# Patient Record
Sex: MALE | URBAN - METROPOLITAN AREA
[De-identification: names, ages, dates, MRNs, and addresses within clinical notes are randomized per-mention and may not be internally consistent; named-entity substitution may affect disease eponyms.]

---

## 2020-04-20 ENCOUNTER — COMMUNICATION - HEALTHEAST (OUTPATIENT)
Dept: HEALTH INFORMATION MANAGEMENT | Facility: CLINIC | Age: 55
End: 2020-04-20

## 2021-06-20 NOTE — LETTER
Letter by Erin Guillermo at      Author: Erin Guillermo Service: -- Author Type: --    Filed:  Encounter Date: 2020 Status: (Other)         2020       Troy Marin  [address]  [facility] [address]    Dear Troy Funes:    We are pleased to provide you with secure, online access to medical information for you and your family. Per your request, we have expanded your account to allow access to the records of the following family members:              Erica Phelpsphilip (privilege ends on 2025.)     How Do I Login?  1. In your Internet browser, go to [email or web address].  2. Click on Sign Up Now   3. Enter your [employer] Activation Code exactly as it appears below. This code will  60 days after it is generated. You will not need to use this code after you have completed the sign-up process. If you do not sign up before the expiration date, you must request a new code.     [employer] Activation Code: [email or web address]  Expires: 2020 10:47 AM    4. Enter in your date of birth and zip code.  5. Create a [employer] username. Think of one that is secure and easy to remember.  Your username must be between 6 and 20 characters.  6. Create a [employer] password. You can change your password at any time. Your password must be between 8 and 45 characters, contain at least two letters and one number, and contain both upper and lower case letters.  7. Choose a security question, enter your answer, and click Next. This can be used to access [employer] if you forget your password.   8. Enter a valid e-mail address to receive e-mail notifications when new information is available in [employer].  9. Click Sign In.        How Do I Access a Family Member's Account?  10. Select the account you want to access by clicking the [place] with the appropriate patient's name at the top of your screen.   11. You will see a disclaimer page letting you know that you will be viewing a family  member's record. Review the disclaimer and then click Accept Proxy Access Disclaimer to proceed.  12. Once you switch to viewing a family member's record, you can navigate to [email or web address] pages the same way you would for yourself. You can return to your own account by clicking the [place] at the top of the screen with your name on it.    13. To customize colors and names of the linked accounts, you can select Personalize from the Profile dropdown menu at the top of the screen, then click the Edit button to make changes.      Additional Information  If you have questions, you can e-mail [email or web address] or call [phone] to talk to our [facility] [email or web address] staff. Remember, [email or web address] is NOT to be used for urgent needs. For medical emergencies, dial 911.

## 2021-06-20 NOTE — LETTER
Letter by Erin Guillermo at      Author: Erin Guillermo Service: -- Author Type: --    Filed:  Encounter Date: 2020 Status: (Other)       N  2020       Troy Funes  [address]  [facility] [address]    Dear Troy Funes:    We are pleased to provide you with secure, online access to medical information for you and your family. Per your request, we have expanded your account to allow access to the records of the following family members:              Erica Funes (privilege ends on 2025.)     How Do I Login?  1. In your Internet browser, go to [email or web address].  2. Click on Sign Up Now   3. Enter your [employer] Activation Code exactly as it appears below. This code will  60 days after it is generated. You will not need to use this code after you have completed the sign-up process. If you do not sign up before the expiration date, you must request a new code.     [employer] Activation Code: [email or web address]  Expires: 2020 10:47 AM    4. Enter in your date of birth and zip code.  5. Create a [place] username. Think of one that is secure and easy to remember.  Your username must be between 6 and 20 characters.  6. Create a [place] password. You can change your password at any time. Your password must be between 8 and 45 characters, contain at least two letters and one number, and contain both upper and lower case letters.  7. Choose a security question, enter your answer, and click Next. This can be used to access [employer] if you forget your password.   8. Enter a valid e-mail address to receive e-mail notifications when new information is available in [employer].  9. Click Sign In.        How Do I Access a Family Member's Account?  10. Select the account you want to access by clicking the [place] with the appropriate patient's name at the top of your screen.   11. You will see a disclaimer page letting you know that you will be viewing a family  member's record. Review the disclaimer and then click Accept Proxy Access Disclaimer to proceed.  12. Once you switch to viewing a family member's record, you can navigate to [email or web address] pages the same way you would for yourself. You can return to your own account by clicking the [place] at the top of the screen with your name on it.    13. To customize colors and names of the linked accounts, you can select Personalize from the Profile dropdown menu at the top of the screen, then click the Edit button to make changes.      Additional Information  If you have questions, you can e-mail [email or web address] or call [phone] to talk to our [facility] [email or web address] staff. Remember, [email or web address] is NOT to be used for urgent needs. For medical emergencies, dial 911.

## 2021-08-22 ENCOUNTER — HEALTH MAINTENANCE LETTER (OUTPATIENT)
Age: 56
End: 2021-08-22

## 2021-10-17 ENCOUNTER — HEALTH MAINTENANCE LETTER (OUTPATIENT)
Age: 56
End: 2021-10-17

## 2022-10-01 ENCOUNTER — HEALTH MAINTENANCE LETTER (OUTPATIENT)
Age: 57
End: 2022-10-01

## 2022-12-26 ENCOUNTER — TRANSCRIBE ORDERS (OUTPATIENT)
Dept: OTHER | Age: 57
End: 2022-12-26

## 2022-12-26 DIAGNOSIS — K20.0 EOSINOPHILIC ESOPHAGITIS: Primary | ICD-10-CM

## 2023-02-28 ENCOUNTER — TELEPHONE (OUTPATIENT)
Dept: GASTROENTEROLOGY | Facility: CLINIC | Age: 58
End: 2023-02-28
Payer: COMMERCIAL

## 2023-02-28 NOTE — TELEPHONE ENCOUNTER
NKECHI, sent mychart      Appt on 4/13/23 with Dr. Hernandez needs rescheduling due to the provider being unavailable. Reschedule with Dr. Hernandez using any open slot, ok per Bibiana GONZALEZ. Left K pod #

## 2023-02-28 NOTE — TELEPHONE ENCOUNTER
REFERRAL INFORMATION:    Referring Provider: Dr. Bina Lord    Referring Clinic: [facility]    Reason for Visit/Diagnosis: Eosinophilic Esophagitis     FUTURE VISIT INFORMATION:    Appointment Date: 4/13/2023    Appointment Time: 11 AM     NOTES STATUS DETAILS   OFFICE NOTE from Referring Provider Care Everywhere [facility]:  12/19/22 - PCC OV with Dr. Lord   OFFICE NOTE from Other Specialist Care Everywhere [place]:  8/10/22 - GI OV with Dr. Najera  6/27/22 - GI OV with Dr. Loyd   HOSPITAL DISCHARGE SUMMARY/  ED VISITS N/A    OPERATIVE REPORT N/A    MEDICATION LIST Care Everywhere         ENDOSCOPY  Care Everywhere [place]:  8/9/22 - EGD   COLONOSCOPY N/A    ERCP N/A    EUS N/A    STOOL TESTING N/A    PERTINENT LABS N/A    PATHOLOGY REPORTS (RELATED) Care Everywhere [place]:  8/9/22 - Esophagus Biopsy (Case: [id number])   IMAGING (CT, MRI, EGD, MRCP, Small Bowel Follow Through/SBT, MR/CT Enterography) In process [place]:  8/8/22 - XR Esophagram     Records Requested    Facility  [facility]  Fax: [phone]   Outcome * 2/28/23 1:57 PM Faxed req to [facility] for images to be pushed to [facility]. - [place]

## 2023-03-02 NOTE — TELEPHONE ENCOUNTER
Called and spoke with patient about rescheduling his appt with Dr. Hernandez. Wanted to offer sooner appt but pt rudely requested to cancel and hung up before I could say anything

## 2023-04-13 ENCOUNTER — PRE VISIT (OUTPATIENT)
Dept: GASTROENTEROLOGY | Facility: CLINIC | Age: 58
End: 2023-04-13

## 2023-08-12 ENCOUNTER — HEALTH MAINTENANCE LETTER (OUTPATIENT)
Age: 58
End: 2023-08-12

## 2024-01-24 ENCOUNTER — PHARMACY VISIT (OUTPATIENT)
Dept: ADMINISTRATIVE | Facility: CLINIC | Age: 59
End: 2024-01-24
Payer: COMMERCIAL

## 2024-01-24 NOTE — PROGRESS NOTES
[facility] Other Clinical Follow Up Assessment   Summary Notes    Patient has elected to stop Dupixent because he does not feel it is working.   Dupixent 300mg/2mL Pen. First filled 5/11/23. Last filled 12/19/23. PDC = 0.89     Anne Marie Rodarte, PharmD  Therapy Management Pharmacist  [facility]  [email or web address]  Specialty: [phone]